# Patient Record
Sex: FEMALE | Race: BLACK OR AFRICAN AMERICAN | NOT HISPANIC OR LATINO | Employment: OTHER | URBAN - METROPOLITAN AREA
[De-identification: names, ages, dates, MRNs, and addresses within clinical notes are randomized per-mention and may not be internally consistent; named-entity substitution may affect disease eponyms.]

---

## 2019-05-22 PROBLEM — I25.10 ATHEROSCLEROSIS OF NATIVE CORONARY ARTERY OF NATIVE HEART WITHOUT ANGINA PECTORIS: Status: ACTIVE | Noted: 2019-05-22

## 2019-05-22 PROBLEM — E03.8 OTHER SPECIFIED HYPOTHYROIDISM: Status: ACTIVE | Noted: 2019-05-22

## 2019-05-22 PROBLEM — H40.9 GLAUCOMA: Status: ACTIVE | Noted: 2019-05-22

## 2019-05-22 PROBLEM — Z79.01 CURRENT USE OF LONG TERM ANTICOAGULATION: Status: ACTIVE | Noted: 2019-05-22

## 2019-05-22 PROBLEM — Z95.810 PRESENCE OF EXTERNAL CARDIAC DEFIBRILLATOR: Status: ACTIVE | Noted: 2019-05-22

## 2019-05-22 PROBLEM — I42.9 IDIOPATHIC CARDIOMYOPATHY: Status: ACTIVE | Noted: 2019-05-22

## 2019-05-22 PROBLEM — I48.91 ATRIAL FIBRILLATION: Status: ACTIVE | Noted: 2019-05-22

## 2019-05-22 PROBLEM — E11.9 DIABETES MELLITUS WITHOUT COMPLICATION: Status: ACTIVE | Noted: 2019-05-22

## 2019-05-22 PROBLEM — I25.110 ATHEROSCLEROSIS OF NATIVE CORONARY ARTERY OF NATIVE HEART WITH UNSTABLE ANGINA PECTORIS: Status: ACTIVE | Noted: 2019-05-22

## 2019-05-22 PROBLEM — E78.5 HYPERLIPIDEMIA: Status: ACTIVE | Noted: 2019-05-22

## 2019-05-22 PROBLEM — I50.22 CHRONIC SYSTOLIC HEART FAILURE: Status: ACTIVE | Noted: 2019-05-22

## 2019-05-22 PROBLEM — I10 HYPERTENSION: Status: ACTIVE | Noted: 2019-05-22

## 2019-06-03 LAB
ALBUMIN: 3.3 G/DL (ref 3.4–5)
ALP ISOS SERPL LEV INH-CCNC: 50 U/L (ref 45–117)
ALT (SGPT): 13 U/L (ref 13–56)
ANION GAP SERPL CALC-SCNC: 9 MMOL/L (ref 4–14)
AST SERPL-CCNC: 13 U/L (ref 15–37)
BILIRUB SERPL-MCNC: 0.3 MG/DL (ref 0.2–1)
BUN SERPL-MCNC: 37 MG/DL (ref 7–18)
BUN/CREAT RATIO: 18.5
CALCIUM SERPL-MCNC: 8.5 MG/DL (ref 8.5–10.1)
CHLORIDE SERPL-SCNC: 106 MMOL/L (ref 98–107)
CO2 SERPL-SCNC: 24 MMOL/L (ref 21–32)
CREAT SERPL-MCNC: 2 MG/DL (ref 0.6–1.3)
DIGOXIN SERPL-MCNC: 2.3 NG/ML (ref 0.8–2.2)
GFR MDRD AF AMER: 29 ML/MIN
GFR MDRD NON AF AMER: 24 ML/MIN
GLUCOSE: 125 MG/DL (ref 74–106)
N-TERMINAL PROBNP (BNP): 581 PG/ML
POTASSIUM: 4.5 MMOL/L (ref 3.5–5.1)
SODIUM: 139 MMOL/L (ref 136–145)
TOTAL PROTEIN: 7.7 G/DL (ref 6.4–8.2)

## 2019-06-11 PROBLEM — T46.0X1A DIGOXIN TOXICITY: Status: ACTIVE | Noted: 2019-06-11

## 2019-06-11 PROBLEM — N17.9 ACUTE KIDNEY INJURY: Status: ACTIVE | Noted: 2019-06-11

## 2019-06-11 PROBLEM — I50.42 CHRONIC COMBINED SYSTOLIC AND DIASTOLIC HEART FAILURE: Status: ACTIVE | Noted: 2019-05-22

## 2019-06-13 PROBLEM — R00.1 BRADYCARDIA: Status: ACTIVE | Noted: 2019-06-13

## 2019-07-09 PROBLEM — Z95.810 PRESENCE OF EXTERNAL CARDIAC DEFIBRILLATOR: Status: RESOLVED | Noted: 2019-05-22 | Resolved: 2019-07-09

## 2019-07-22 PROBLEM — N18.30 STAGE 3 CHRONIC KIDNEY DISEASE: Status: ACTIVE | Noted: 2019-07-22

## 2019-07-23 PROBLEM — Z72.0 TOBACCO ABUSE: Status: ACTIVE | Noted: 2019-07-23

## 2019-08-10 ENCOUNTER — NURSE TRIAGE (OUTPATIENT)
Dept: ADMINISTRATIVE | Facility: CLINIC | Age: 79
End: 2019-08-10

## 2019-08-10 NOTE — TELEPHONE ENCOUNTER
Reason for Disposition   [1] Weakness (i.e., paralysis, loss of muscle strength) of the face, arm / hand, or leg / foot on one side of the body AND [2] sudden onset AND [3] present now    Protocols used: NEUROLOGIC DEFICIT-A-AH    Pt's daughter states pt had a trip and fall Thursday, Friday, and today. Daughter states pt is supposed to use walker and has been using a cane instead. Daughter states she noticed pt had RLE weakness yesterday. Daughter advised per protocol and daughter verbalizes understanding.

## 2019-09-09 PROBLEM — I63.322 CEREBROVASCULAR ACCIDENT (CVA) DUE TO THROMBOSIS OF LEFT ANTERIOR CEREBRAL ARTERY: Status: ACTIVE | Noted: 2019-09-09

## 2020-07-29 PROBLEM — T46.0X1A DIGOXIN TOXICITY: Status: RESOLVED | Noted: 2019-06-11 | Resolved: 2020-07-29

## 2020-09-10 PROBLEM — I50.1 HEART FAILURE, LEFT, WITH LVEF <=30%: Status: ACTIVE | Noted: 2020-09-10

## 2020-09-10 PROBLEM — R53.1 RIGHT SIDED WEAKNESS: Status: ACTIVE | Noted: 2020-09-10

## 2022-03-15 PROBLEM — E11.65 TYPE 2 DIABETES MELLITUS WITH HYPERGLYCEMIA, WITHOUT LONG-TERM CURRENT USE OF INSULIN: Status: ACTIVE | Noted: 2019-05-22

## 2022-03-15 PROBLEM — I50.43 ACUTE ON CHRONIC COMBINED SYSTOLIC AND DIASTOLIC HEART FAILURE: Status: ACTIVE | Noted: 2019-05-22

## 2022-03-15 PROBLEM — K59.01 SLOW TRANSIT CONSTIPATION: Status: ACTIVE | Noted: 2022-03-15

## 2023-02-04 PROBLEM — E11.9 TYPE 2 DIABETES MELLITUS: Status: ACTIVE | Noted: 2023-02-04

## 2023-02-04 PROBLEM — I50.33 ACUTE ON CHRONIC DIASTOLIC HEART FAILURE: Status: ACTIVE | Noted: 2023-02-04

## 2023-02-04 PROBLEM — I48.91 A-FIB: Status: ACTIVE | Noted: 2023-02-04

## 2023-02-04 PROBLEM — N18.30 STAGE 3 CHRONIC KIDNEY DISEASE: Status: RESOLVED | Noted: 2019-07-22 | Resolved: 2023-02-04

## 2023-02-04 PROBLEM — E11.22 CKD STAGE 3 SECONDARY TO DIABETES: Status: ACTIVE | Noted: 2023-02-04

## 2023-02-04 PROBLEM — E87.1 HYPONATREMIA: Status: ACTIVE | Noted: 2023-02-04

## 2023-02-04 PROBLEM — D64.9 ANEMIA: Status: ACTIVE | Noted: 2023-02-04

## 2023-02-04 PROBLEM — I10 HTN (HYPERTENSION): Status: ACTIVE | Noted: 2023-02-04

## 2023-02-04 PROBLEM — N39.0 SEPSIS SECONDARY TO UTI: Status: ACTIVE | Noted: 2023-02-04

## 2023-02-04 PROBLEM — E03.9 HYPOTHYROID: Status: ACTIVE | Noted: 2023-02-04
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
## 2023-02-04 PROBLEM — A41.9 SEPSIS SECONDARY TO UTI: Status: ACTIVE | Noted: 2023-02-04

## 2023-02-04 PROBLEM — N18.30 CKD STAGE 3 SECONDARY TO DIABETES: Status: ACTIVE | Noted: 2023-02-04

## 2023-02-05 PROBLEM — E83.39 HYPOPHOSPHATEMIA: Status: ACTIVE | Noted: 2023-02-05

## 2023-02-05 PROBLEM — R79.89 ELEVATED LACTIC ACID LEVEL: Status: ACTIVE | Noted: 2023-02-05

## 2023-02-05 PROBLEM — E87.6 HYPOKALEMIA: Status: ACTIVE | Noted: 2023-02-05

## 2023-02-06 PROBLEM — E87.1 HYPONATREMIA: Status: RESOLVED | Noted: 2023-02-04 | Resolved: 2023-02-06

## 2023-02-06 PROBLEM — R79.89 ELEVATED LACTIC ACID LEVEL: Status: RESOLVED | Noted: 2023-02-05 | Resolved: 2023-02-06

## 2023-05-02 PROBLEM — Z00.00 HEALTHCARE MAINTENANCE: Status: ACTIVE | Noted: 2023-05-02

## 2023-05-08 PROBLEM — A41.9 SEPSIS SECONDARY TO UTI: Status: RESOLVED | Noted: 2023-02-04 | Resolved: 2023-05-08

## 2023-05-08 PROBLEM — N39.0 SEPSIS SECONDARY TO UTI: Status: RESOLVED | Noted: 2023-02-04 | Resolved: 2023-05-08

## 2023-08-07 PROBLEM — Z00.00 HEALTHCARE MAINTENANCE: Status: RESOLVED | Noted: 2023-05-02 | Resolved: 2023-08-07
